# Patient Record
(demographics unavailable — no encounter records)

---

## 2025-01-08 NOTE — HISTORY OF PRESENT ILLNESS
[de-identified] : Ricardo Gonzales  is a 59 year man with a history of presbycusis who uses hearing aids. He is finding that his ears are itchy but he feels water is getting stuck after swimming.  His LPR is under control with diet and alkaline water.

## 2025-01-08 NOTE — PHYSICAL EXAM
[TextEntry] : PHYSICAL EXAM  General: The patient was alert and oriented and in no distress. Voice was normal.    Face: The patient had no facial asymmetry or mass. The skin was unremarkable.  Ears: External ears were normal without deformity. Ear canals: mild bilateral dermatitis Tympanic membranes were intact and normal. No perforation or effusion  Nose:  The external nose had no significant deformity.  There was no facial tenderness.  On anterior rhinoscopy, the nasal mucosa was normal.  The anterior septum was normal. There were no visualized polyps purulence  or masses.  Oral cavity: The oral mucosa was normal. The oral and base of tongue were clear and without mass. The gingival and buccal mucosa were moist and without lesions. The palate moved well. There was no cleft palate. There appeared to be good salivary flow.   There was no pus, erythema or mass in the oral cavity/oropharynx.  Neck:  The neck was symmetrical. The parotid and submandibular glands were normal without masses. The trachea was midline and there was no unusual crepitus. Thyroid was smooth and nontender and no masses were palpated. Cervical adenopathy- none.

## 2025-04-15 NOTE — HISTORY OF PRESENT ILLNESS
[de-identified] : AIDEN DAVENPORT  is a 59 year man with a history of emergency appendectomy 2-3 days ago with GET. He is having throat pain.

## 2025-04-15 NOTE — PHYSICAL EXAM
[TextEntry] : PHYSICAL EXAM  General: The patient was alert and oriented and in no distress. Voice was normal.  Neurologic: Cranial Nerves II-XII intact PERRLA There was no significant nystagmus or disconjugate gaze noted.  EOM's: Normal  Face: The patient had no facial asymmetry or mass. The skin was unremarkable.  Ears: External ears were normal without deformity. Ear canals were normal. Tympanic membranes were intact and normal. No perforation or effusion  Nose:  The external nose had no significant deformity.  There was no facial tenderness.  On anterior rhinoscopy, the nasal mucosa was normal.  The anterior septum was normal. There were no visualized polyps purulence  or masses.  Oral cavity: SOFT PALATE AND UVULA, SWOLLEN WITH  SUPERFICIAL  WHOITE MACERATED TISSUE. NO ABSCESS The oral mucosa was normal. The oral and base of tongue were clear and without mass. The gingival and buccal mucosa were moist and without lesions. The palate moved well. There was no cleft palate. There appeared to be good salivary flow.   There was no pus, erythema or mass in the oral cavity/oropharynx.  Neck:  The neck was symmetrical. The parotid and submandibular glands were normal without masses. The trachea was midline and there was no unusual crepitus. Thyroid was smooth and nontender and no masses were palpated. Cervical adenopathy- none.  Procedure: Fiberoptic Nasolaryngoscopy Dx: Dysphagia, LPRD, Hoarseness Dr. Mathew Mcdermott Anesthetic: Lidocaine and oxymetazoline topical   Findings: The flexible scope was easily passed via the nose. The larynx was grossly normal. The epiglottis was normal. The hypopharynx and base of tongue were normal. The vallecula was normal. The vocal folds were grossly normal and moved normally. There are  mild changes of laryngopharyngeal reflux  (LPR)  manifest by mild ventricular swelling,  posterior commissure thickening.   No lesions were noted.

## 2025-04-15 NOTE — ASSESSMENT
[FreeTextEntry1] : AIDEN DAVENPORT HAS THRAOT PAIN DUE TO GENERAL ANESTHESIA AND INFLAMMATION OF SOFT PALATE AND UVULA.  He will rinse with hydrogen peroxide/ water and or salt water. He will call me in a few days.

## 2025-04-28 NOTE — REASON FOR VISIT
[Post Op: _________] : a [unfilled] post op visit [FreeTextEntry1] : He underwent a laparoscopic appendectomy and umbilical hernia repair on April 12, 2025. [FreeTextEntry2] : 4/12/2025

## 2025-04-28 NOTE — DATA REVIEWED
[FreeTextEntry1] : Pathology (4/12/2025) - marked acute appendicitis with transmural inflammation and acute serositis.

## 2025-04-28 NOTE — PHYSICAL EXAM
[Calm] : calm [de-identified] : NAD, comfortable  [de-identified] : NCAT, no scleral icterus  [de-identified] : soft NT ND.  Incisions healing well without erythema or induration.  No evidence of a recurrent umbilical hernia or any incisional hernias on Valsalva maneuver.  [de-identified] : No clubbing, cyanosis, or edema.  [de-identified] : Warm, dry.  [de-identified] : A&Ox3

## 2025-04-28 NOTE — HISTORY OF PRESENT ILLNESS
[de-identified] : Mr. Gonzales presented to the [facility] emergency department on April 11, 2025, with abdominal pain.  He was noted to have a normal WBC count on admission.  He underwent a CT abdomen/pelvis, which demonstrated acute appendicitis. He underwent a laparoscopic appendectomy and umbilical hernia repair on April 12, 2025.  He was discharged home on April 13, 2025. [de-identified] : He presented today for a routine post-operative visit.  He is overall feeling well.  He denied fever, chills, nausea, vomiting, diarrhea, or constipation.

## 2025-04-28 NOTE — ASSESSMENT
[FreeTextEntry1] : Mr. Gonzales is a 59-year-old man who underwent a laparoscopic appendectomy and umbilical hernia repair on April 12, 2025.  He is recovering as expected and will follow up with me as needed.